# Patient Record
Sex: MALE | Race: OTHER
[De-identification: names, ages, dates, MRNs, and addresses within clinical notes are randomized per-mention and may not be internally consistent; named-entity substitution may affect disease eponyms.]

---

## 2021-03-12 ENCOUNTER — HOSPITAL ENCOUNTER (EMERGENCY)
Dept: HOSPITAL 61 - ER | Age: 56
Discharge: HOME | End: 2021-03-12
Payer: SELF-PAY

## 2021-03-12 VITALS — DIASTOLIC BLOOD PRESSURE: 69 MMHG | SYSTOLIC BLOOD PRESSURE: 109 MMHG

## 2021-03-12 VITALS — WEIGHT: 188.05 LBS | BODY MASS INDEX: 31.33 KG/M2 | HEIGHT: 65 IN

## 2021-03-12 DIAGNOSIS — R51.9: ICD-10-CM

## 2021-03-12 DIAGNOSIS — R11.2: ICD-10-CM

## 2021-03-12 DIAGNOSIS — R42: Primary | ICD-10-CM

## 2021-03-12 LAB
ALBUMIN SERPL-MCNC: 3.8 G/DL (ref 3.4–5)
ALBUMIN/GLOB SERPL: 1.2 {RATIO} (ref 1–1.7)
ALP SERPL-CCNC: 75 U/L (ref 46–116)
ALT SERPL-CCNC: 34 U/L (ref 16–63)
ANION GAP SERPL CALC-SCNC: 12 MMOL/L (ref 6–14)
APTT PPP: YELLOW S
AST SERPL-CCNC: 18 U/L (ref 15–37)
BACTERIA #/AREA URNS HPF: 0 /HPF
BASOPHILS # BLD AUTO: 0 X10^3/UL (ref 0–0.2)
BASOPHILS NFR BLD: 0 % (ref 0–3)
BILIRUB SERPL-MCNC: 0.6 MG/DL (ref 0.2–1)
BILIRUB UR QL STRIP: NEGATIVE
BUN SERPL-MCNC: 18 MG/DL (ref 8–26)
BUN/CREAT SERPL: 20 (ref 6–20)
CALCIUM SERPL-MCNC: 8.5 MG/DL (ref 8.5–10.1)
CHLORIDE SERPL-SCNC: 104 MMOL/L (ref 98–107)
CO2 SERPL-SCNC: 24 MMOL/L (ref 21–32)
CREAT SERPL-MCNC: 0.9 MG/DL (ref 0.7–1.3)
EOSINOPHIL NFR BLD: 0 % (ref 0–3)
EOSINOPHIL NFR BLD: 0 X10^3/UL (ref 0–0.7)
ERYTHROCYTE [DISTWIDTH] IN BLOOD BY AUTOMATED COUNT: 13 % (ref 11.5–14.5)
FIBRINOGEN PPP-MCNC: CLEAR MG/DL
GFR SERPLBLD BASED ON 1.73 SQ M-ARVRAT: 87.9 ML/MIN
GLUCOSE SERPL-MCNC: 138 MG/DL (ref 70–99)
HCT VFR BLD CALC: 48.5 % (ref 39–53)
HGB BLD-MCNC: 16.8 G/DL (ref 13–17.5)
LYMPHOCYTES # BLD: 1.1 X10^3/UL (ref 1–4.8)
LYMPHOCYTES NFR BLD AUTO: 12 % (ref 24–48)
MCH RBC QN AUTO: 31 PG (ref 25–35)
MCHC RBC AUTO-ENTMCNC: 35 G/DL (ref 31–37)
MCV RBC AUTO: 90 FL (ref 79–100)
MONO #: 0.3 X10^3/UL (ref 0–1.1)
MONOCYTES NFR BLD: 4 % (ref 0–9)
NEUT #: 7.7 X10^3/UL (ref 1.8–7.7)
NEUTROPHILS NFR BLD AUTO: 84 % (ref 31–73)
NITRITE UR QL STRIP: NEGATIVE
PH UR STRIP: 8 [PH]
PLATELET # BLD AUTO: 236 X10^3/UL (ref 140–400)
POTASSIUM SERPL-SCNC: 3.6 MMOL/L (ref 3.5–5.1)
PROT SERPL-MCNC: 7 G/DL (ref 6.4–8.2)
PROT UR STRIP-MCNC: NEGATIVE MG/DL
RBC # BLD AUTO: 5.42 X10^6/UL (ref 4.3–5.7)
RBC #/AREA URNS HPF: 0 /HPF (ref 0–2)
SODIUM SERPL-SCNC: 140 MMOL/L (ref 136–145)
UROBILINOGEN UR-MCNC: 0.2 MG/DL
WBC # BLD AUTO: 9.2 X10^3/UL (ref 4–11)
WBC #/AREA URNS HPF: 0 /HPF (ref 0–4)

## 2021-03-12 PROCEDURE — 96361 HYDRATE IV INFUSION ADD-ON: CPT

## 2021-03-12 PROCEDURE — 96374 THER/PROPH/DIAG INJ IV PUSH: CPT

## 2021-03-12 PROCEDURE — 83880 ASSAY OF NATRIURETIC PEPTIDE: CPT

## 2021-03-12 PROCEDURE — 81001 URINALYSIS AUTO W/SCOPE: CPT

## 2021-03-12 PROCEDURE — 84484 ASSAY OF TROPONIN QUANT: CPT

## 2021-03-12 PROCEDURE — 71045 X-RAY EXAM CHEST 1 VIEW: CPT

## 2021-03-12 PROCEDURE — 96375 TX/PRO/DX INJ NEW DRUG ADDON: CPT

## 2021-03-12 PROCEDURE — 36415 COLL VENOUS BLD VENIPUNCTURE: CPT

## 2021-03-12 PROCEDURE — 85025 COMPLETE CBC W/AUTO DIFF WBC: CPT

## 2021-03-12 PROCEDURE — 70450 CT HEAD/BRAIN W/O DYE: CPT

## 2021-03-12 PROCEDURE — 93005 ELECTROCARDIOGRAM TRACING: CPT

## 2021-03-12 PROCEDURE — 80053 COMPREHEN METABOLIC PANEL: CPT

## 2021-03-12 PROCEDURE — 99285 EMERGENCY DEPT VISIT HI MDM: CPT

## 2021-03-12 RX ADMIN — DEXAMETHASONE SODIUM PHOSPHATE ONE MG: 4 INJECTION, SOLUTION INTRAMUSCULAR; INTRAVENOUS at 08:22

## 2021-03-12 RX ADMIN — MECLIZINE ONE MG: 12.5 TABLET ORAL at 08:22

## 2021-03-12 RX ADMIN — BACITRACIN ONE MLS/HR: 5000 INJECTION, POWDER, FOR SOLUTION INTRAMUSCULAR at 08:21

## 2021-03-12 RX ADMIN — PROCHLORPERAZINE EDISYLATE ONE MG: 5 INJECTION INTRAMUSCULAR; INTRAVENOUS at 08:22

## 2021-03-12 NOTE — RAD
EXAMINATION: CT HEAD/BRAIN WO (CT HEAD WITHOUT IV CONTRAST)



CLINICAL HISTORY: Dizziness, sudden headache



TECHNIQUE: Serial axial images without IV contrast were obtained from the vertex to the foramen magnu
m.



CT Dose Reduction Employed: One or more of the following individualized dose reduction techniques wer
e utilized for this examination:  1. Automated exposure control  2. Adjustment of the mA and/or kV ac
cording to patient size  3. Use of iterative reconstruction technique.



COMPARISON: None





FINDINGS:   



Acute Change: No evidence of an acute infarct or other acute parenchymal process.

  

Hemorrhage: No evidence of acute intracranial hemorrhage.



Mass Lesion/Mass Effect: No evidence of intracranial mass or extraaxial fluid collection. No signific
ant mass effect.

 

Chronic Change: None significant changes.



Parenchyma: No significant volume loss. Parenchyma otherwise within normal limits for age.



Ventricles: Ventricles within normal limits for age.



Paranasal Sinuses and Skull Base: Partially visualized small mucous retention cyst or polyp in the an
terior left maxillary sinus. Minimal mucoperiosteal thickening in the dependent right maxillary sinus
. Visualized skull base and soft tissues unremarkable.





IMPRESSION:  



No evidence of acute intracranial abnormality.



Electronically signed by: Jordon Varma DO (3/12/2021 8:51 AM) [id number]

## 2021-03-12 NOTE — ED.ADGEN
Past Medical History


Past Medical History:  Other


Additional Past Medical Histor:  SBO


Past Surgical History:  No Surgical History


Smoking Status:  Never Smoker


Alcohol Use:  None





General Adult


EDM:


Chief Complaint:  DIZZY/LIGHT HEADED





HPI:


HPI:


Patient is a 55-year-old male who presents to the emergency room complaining of 

dizziness, nausea, vomiting, headache.  Patient states he woke up around 4:00 t

his morning and walked to the bathroom.  At that time he relates that he felt 

dizzy.  He has had dizziness previously and states that the two other times that

this is happened feels similar.  He states that the headache started after he 

started vomiting.  He states that it is frontal and is not severe in nature.  He

feels dizzy mostly when he gets up and moves around.





Review of Systems:


Review of Systems:


Complete ROS is negative unless otherwise documented in HPI





Current Medications:





Current Medications








 Medications


  (Trade)  Dose


 Ordered  Sig/Dona  Start Time


 Stop Time Status Last Admin


Dose Admin


 


 Dexamethasone


 Sodium Phosphate


  (Decadron)  10 mg  1X  ONCE  3/12/21 08:00


 3/12/21 08:07 DC 3/12/21 08:22


10 MG


 


 Diphenhydramine


 HCl


  (Benadryl)  25 mg  1X  ONCE  3/12/21 08:00


 3/12/21 08:07 DC 3/12/21 08:22


25 MG


 


 Meclizine HCl


  (Antivert)  25 mg  1X  ONCE  3/12/21 08:00


 3/12/21 08:07 DC 3/12/21 08:22


25 MG


 


 Prochlorperazine


 Edisylate


  (Compazine)  10 mg  1X  ONCE  3/12/21 08:00


 3/12/21 08:07 DC 3/12/21 08:22


10 MG


 


 Sodium Chloride  1,000 ml @ 


 1,000 mls/hr  1X  ONCE  3/12/21 08:00


 3/12/21 08:59 DC 3/12/21 08:21


1,000 MLS/HR











Allergies:


Allergies:





Allergies








Coded Allergies Type Severity Reaction Last Updated Verified


 


  No Known Drug Allergies    3/12/21 No











Physical Exam:


PE:


General: Awake, alert, NAD. Well Nourished, well hydrated. Cooperative


HEENT: Atraumatic, EOMI, PERRL, airway patent, moist oral mucosa


Neck: Supple, trachea midline


Respiratory: CTA bilaterally, normal effort, no wheezing/crackles


CV: RRR, no murmur, cap refill <2


GI: Soft, nondistended, nontender, no masses


MSK: No obvious deformities


Skin: Warm, dry, intact


Neuro: A&O x3, speech NL, 5/5 strength in BUE/BLE distally and proximally, CN 2-

12 intact, cerebellar testing normal


Psych: Normal affect, normal mood, not suicidal or homicidal





Current Patient Data:


Labs:





                                Laboratory Tests








Test


 3/12/21


08:00 3/12/21


09:20


 


White Blood Count


 9.2 x10^3/uL


(4.0-11.0) 





 


Red Blood Count


 5.42 x10^6/uL


(4.30-5.70) 





 


Hemoglobin


 16.8 g/dL


(13.0-17.5) 





 


Hematocrit


 48.5 %


(39.0-53.0) 





 


Mean Corpuscular Volume


 90 fL ()


 





 


Mean Corpuscular Hemoglobin 31 pg (25-35)   


 


Mean Corpuscular Hemoglobin


Concent 35 g/dL


(31-37) 





 


Red Cell Distribution Width


 13.0 %


(11.5-14.5) 





 


Platelet Count


 236 x10^3/uL


(140-400) 





 


Neutrophils (%) (Auto) 84 % (31-73)  H 


 


Lymphocytes (%) (Auto) 12 % (24-48)  L 


 


Monocytes (%) (Auto) 4 % (0-9)   


 


Eosinophils (%) (Auto) 0 % (0-3)   


 


Basophils (%) (Auto) 0 % (0-3)   


 


Neutrophils # (Auto)


 7.7 x10^3/uL


(1.8-7.7) 





 


Lymphocytes # (Auto)


 1.1 x10^3/uL


(1.0-4.8) 





 


Monocytes # (Auto)


 0.3 x10^3/uL


(0.0-1.1) 





 


Eosinophils # (Auto)


 0.0 x10^3/uL


(0.0-0.7) 





 


Basophils # (Auto)


 0.0 x10^3/uL


(0.0-0.2) 





 


Sodium Level


 140 mmol/L


(136-145) 





 


Potassium Level


 3.6 mmol/L


(3.5-5.1) 





 


Chloride Level


 104 mmol/L


() 





 


Carbon Dioxide Level


 24 mmol/L


(21-32) 





 


Anion Gap 12 (6-14)   


 


Blood Urea Nitrogen


 18 mg/dL


(8-26) 





 


Creatinine


 0.9 mg/dL


(0.7-1.3) 





 


Estimated GFR


(Cockcroft-Gault) 87.9  


 





 


BUN/Creatinine Ratio 20 (6-20)   


 


Glucose Level


 138 mg/dL


(70-99)  H 





 


Calcium Level


 8.5 mg/dL


(8.5-10.1) 





 


Total Bilirubin


 0.6 mg/dL


(0.2-1.0) 





 


Aspartate Amino Transferase


(AST) 18 U/L (15-37)


 





 


Alanine Aminotransferase (ALT)


 34 U/L (16-63)


 





 


Alkaline Phosphatase


 75 U/L


() 





 


Troponin I Quantitative


 < 0.017 ng/mL


(0.000-0.055) 





 


NT-Pro-B-Type Natriuretic


Peptide 75 pg/mL


(0-124) 





 


Total Protein


 7.0 g/dL


(6.4-8.2) 





 


Albumin


 3.8 g/dL


(3.4-5.0) 





 


Albumin/Globulin Ratio 1.2 (1.0-1.7)   


 


Ethyl Alcohol Level


 < 10 mg/dL


(0-10) 





 


Urine Collection Type  Unknown  


 


Urine Color  Yellow  


 


Urine Clarity  Clear  


 


Urine pH


 


 8.0 (<5.0-8.0)





 


Urine Specific Gravity


 


 1.025


(1.000-1.030)


 


Urine Protein


 


 Negative mg/dL


(NEG-TRACE)


 


Urine Glucose (UA)


 


 Negative mg/dL


(NEG)


 


Urine Ketones (Stick)


 


 Negative mg/dL


(NEG)


 


Urine Blood


 


 Negative (NEG)





 


Urine Nitrite


 


 Negative (NEG)





 


Urine Bilirubin


 


 Negative (NEG)





 


Urine Urobilinogen Dipstick


 


 0.2 mg/dL (0.2


mg/dL)


 


Urine Leukocyte Esterase


 


 Negative (NEG)





 


Urine RBC  0 /HPF (0-2)  


 


Urine WBC  0 /HPF (0-4)  


 


Urine Bacteria


 


 0 /HPF (0-FEW)





 


Urine Mucus  Marked /LPF  





                                Laboratory Tests


3/12/21 08:00








                                Laboratory Tests


3/12/21 08:00











Vital Signs:





                                   Vital Signs








  Date Time  Temp Pulse Resp B/P (MAP) Pulse Ox O2 Delivery O2 Flow Rate FiO2


 


3/12/21 09:41  68  109/69 (82) 98 Room Air  


 


3/12/21 07:55 98.8  18     





 98.8       











EKG:


EKG:


[]





Heart Score:


C/O Chest Pain:  N/A


Risk Factors:


Risk Factors:  DM, Current or recent (<one month) smoker, HTN, HLP, family 

history of CAD, obesity.


Risk Scores:


Score 0 - 3:  2.5% MACE over next 6 weeks - Discharge Home


Score 4 - 6:  20.3% MACE over next 6 weeks - Admit for Clinical Observation


Score 7 - 10:  72.7% MACE over next 6 weeks - Early Invasive Strategies





Radiology/Procedures:


Radiology/Procedures:


[]





Course & Med Decision Making:


Course & Med Decision Making


Pertinent Labs and Imaging studies reviewed. (See chart for details)





Patient is a 55-year-old male who presents to the emergency room complaining of 

dizziness, headache, nausea, vomiting.  Patient states the vomiting seems to be 

due to his dizziness.  He is able to walk up but feels dizzy when he does try to

 walk.  Is a normal neurologic exam.  CT head was ordered to rule out a 

subarachnoid hemorrhage or intracranial bleed given his headache and vomiting.  

Patient has had the symptoms for less than 6 hours.  CT head is normal.  Given 

that his symptoms have been present for less than 6 hours patient does not need 

a lumbar puncture to rule out subarachnoid hemorrhage.  Work-up was ordered to 

rule out other causes of dizziness.  Patient was treated for a migraine and 

given meclizine for dizziness.  His last admission for dizziness and vomiting 

was for small bowel obstruction.  He states he did have abdominal pain at that 

time and does not have abdominal pain today.  Abdomen is soft and nontender.  

Work-up is unremarkable.  Patient was given water and ambulated around the 

emergency room without any difficulty.  He states he is feeling much better 

would like to go home.  It is likely that this is related to either migraine or 

vertigo.  Patient's test results and vitals while in the ED were fully reviewed 

and discussed with the patient. Patient is stable and at this time does not need

 admission to the hospital. We have discussed strict return precautions and the 

importance of following up with their Primary Care Physician. Patient stated 

understanding and was given an opportunity to ask any questions. Patient is in 

agreement with plan.





Dragon Disclaimer:


Dragon Disclaimer:


This electronic medical record was generated, in whole or in part, using a voice

 recognition dictation system.





Departure


Departure


Impression:  


   Primary Impression:  


   Dizziness


Disposition:  01 DC HOME SELF CARE/HOMELESS


Condition:  STABLE


Referrals:  


UNKNOWN PCP NAME (PCP)


Patient Instructions:  Dizziness


Scripts


Meclizine Hcl (MECLIZINE HCL) 25 Mg Tablet


1 TAB PO TID PRN for dizziness, #20 TAB


   Prov: BREA TOMLINSON MD         3/12/21











BREA TOMLINSON MD              Mar 12, 2021 09:05

## 2021-03-12 NOTE — RAD
EXAM: Chest, single view.



HISTORY: Dizziness.



COMPARISON: 5/2/2018



FINDINGS: A frontal view of the chest is obtained. There is no infiltrate, pleural effusion or pneumo
thorax. The heart is normal in size.



IMPRESSION: No acute pulmonary finding.



Electronically signed by: Analia Padilla MD (3/12/2021 8:35 AM) [id number]

## 2021-03-12 NOTE — EKG
[facility]

              [address] [place] [address]

Test Date:    2021               Test Time:    08:07:59

Pat Name:     GHAZAL ANGELA   Department:   

Patient ID:   [id number]           Room:          

Gender:       M                        Technician:   

:          [phone]               Requested By: BREA TOMLINSON

Order Number: [id number]           Reading MD:     

                                 Measurements

Intervals                              Axis          

Rate:         72                       P:            31

GA:           152                      QRS:          -10

QRSD:         82                       T:            11

QT:           386                                    

QTc:          424                                    

                           Interpretive Statements

SINUS RHYTHM

LEFTWARD AXIS

OTHERWISE NORMAL ECG

RI6.01

No previous ECG available for comparison